# Patient Record
Sex: FEMALE | Race: WHITE | NOT HISPANIC OR LATINO | URBAN - METROPOLITAN AREA
[De-identification: names, ages, dates, MRNs, and addresses within clinical notes are randomized per-mention and may not be internally consistent; named-entity substitution may affect disease eponyms.]

---

## 2024-09-10 ENCOUNTER — APPOINTMENT (RX ONLY)
Dept: URBAN - METROPOLITAN AREA CLINIC 19 | Facility: CLINIC | Age: 29
Setting detail: DERMATOLOGY
End: 2024-09-10

## 2024-09-10 VITALS — WEIGHT: 180 LBS | HEIGHT: 65 IN

## 2024-09-10 DIAGNOSIS — R21 RASH AND OTHER NONSPECIFIC SKIN ERUPTION: ICD-10-CM

## 2024-09-10 PROCEDURE — ? COUNSELING

## 2024-09-10 PROCEDURE — ? PRESCRIPTION MEDICATION MANAGEMENT

## 2024-09-10 PROCEDURE — ? ADDITIONAL NOTES

## 2024-09-10 PROCEDURE — ? PRESCRIPTION

## 2024-09-10 PROCEDURE — 99204 OFFICE O/P NEW MOD 45 MIN: CPT

## 2024-09-10 RX ORDER — FLUOROURACIL 50 MG/ML
SOLUTION TOPICAL
Qty: 1 | Refills: 0 | Status: ERX | COMMUNITY
Start: 2024-09-10

## 2024-09-10 RX ADMIN — FLUOROURACIL: 50 SOLUTION TOPICAL at 00:00

## 2024-09-10 ASSESSMENT — LOCATION DETAILED DESCRIPTION DERM
LOCATION DETAILED: LOWER STERNUM
LOCATION DETAILED: SUBXIPHOID
LOCATION DETAILED: XIPHOID

## 2024-09-10 ASSESSMENT — LOCATION SIMPLE DESCRIPTION DERM
LOCATION SIMPLE: ABDOMEN
LOCATION SIMPLE: CHEST

## 2024-09-10 ASSESSMENT — BSA RASH: BSA RASH: 0

## 2024-09-10 ASSESSMENT — LOCATION ZONE DERM: LOCATION ZONE: TRUNK

## 2024-09-10 ASSESSMENT — SEVERITY ASSESSMENT: SEVERITY: MILD

## 2024-09-10 ASSESSMENT — PAIN INTENSITY VAS: HOW INTENSE IS YOUR PAIN 0 BEING NO PAIN, 10 BEING THE MOST SEVERE PAIN POSSIBLE?: 7/10 PAIN

## 2024-09-10 NOTE — PROCEDURE: ADDITIONAL NOTES
Additional Notes: ** Patient was able to pull biopsy results from her patient portal from prior dermatologist. Results show dx of verruca plana. She has been using nystatin/triamcinolone cream and then compound w with no improvement.\\n\\nWe discussed that verruca plana typically present as flat topped papules however none are present on exam today. Instead a faint confluent symmetrical somewhat atrophic shiny appearing area is visible in the central chest. She reports that the area is painful and uncomfortable especially when using OTC compound W.\\n\\nI advised her that we can trial a compounded wart cream, but this may lead to severe irritation and rash. \\n\\nA second biopsy will be performed if no improvement with this method.
Detail Level: Simple
Render Risk Assessment In Note?: no

## 2024-09-10 NOTE — HPI: RASH
What Type Of Note Output Would You Prefer (Optional)?: Bullet Format
Is The Patient Presenting As Previously Scheduled?: No, they are coming in before their scheduled appointment
How Severe Is Your Rash?: mild
Is This A New Presentation, Or A Follow-Up?: Rash
Additional History: Patient presents today for a rash n the chest and under breast ,patient notes she has had rash for about 2 years and it started being bothersome over the months , she states she originally got rash on a family vacation this year area became very bothersome , painful her previous dermatologist prescribed Ketoconazole for 2 -3 weeks ,rash  didn’t resolve so [facility] did a punch biopsy , diagnosis was Verrucca planus, they then prescribed Nystatin + Triamcinolone combo cream and patient had to move so she is here to follow up with us.

## 2024-09-10 NOTE — PROCEDURE: COUNSELING
Patient Specific Counseling (Will Not Stick From Patient To Patient): Patient originally got rash on a family vacation this year area became very bothersome , painful her previous dermatologist prescribed Ketoconazole for 2 -3 weeks ,rash  didn’t resolve so [facility] did a punch biopsy , diagnosis was Verrucca planus, ( path in attachmentments) they prescribed Nystatin + Triamcinolone combo cream and patient had to move so she is here to follow up with us.
Detail Level: Detailed

## 2024-09-10 NOTE — PROCEDURE: PRESCRIPTION MEDICATION MANAGEMENT
Plan: IN RESERVE : Betamethasone dip. Cream
Detail Level: Zone
Initiate Treatment: Coumpound cream from [facility] ( fluouracil / cimetidine / axyclovir / sal acid.
Render In Strict Bullet Format?: No